# Patient Record
Sex: MALE | Race: WHITE | Employment: OTHER | URBAN - METROPOLITAN AREA
[De-identification: names, ages, dates, MRNs, and addresses within clinical notes are randomized per-mention and may not be internally consistent; named-entity substitution may affect disease eponyms.]

---

## 2022-11-02 ENCOUNTER — TELEPHONE (OUTPATIENT)
Dept: ORTHOPEDIC SURGERY | Age: 42
End: 2022-11-02

## 2022-11-02 ENCOUNTER — OFFICE VISIT (OUTPATIENT)
Dept: ORTHOPEDIC SURGERY | Age: 42
End: 2022-11-02
Payer: COMMERCIAL

## 2022-11-02 VITALS
OXYGEN SATURATION: 97 % | SYSTOLIC BLOOD PRESSURE: 137 MMHG | TEMPERATURE: 98.5 F | DIASTOLIC BLOOD PRESSURE: 62 MMHG | WEIGHT: 164 LBS | HEART RATE: 72 BPM

## 2022-11-02 DIAGNOSIS — S46.211A BICEPS TENDON RUPTURE, RIGHT, INITIAL ENCOUNTER: Primary | ICD-10-CM

## 2022-11-02 PROCEDURE — 99203 OFFICE O/P NEW LOW 30 MIN: CPT | Performed by: ORTHOPAEDIC SURGERY

## 2022-11-02 NOTE — PROGRESS NOTES
Identified pt with two pt identifiers (name and ). Reviewed chart in preparation for visit and have obtained necessary documentation. Nimisha Littlejohn is a 43 y.o. male  Chief Complaint   Patient presents with    Arm Pain     RT Bicep      Visit Vitals  /62 (BP 1 Location: Left upper arm, BP Patient Position: Sitting, BP Cuff Size: Large adult)   Pulse 72   Temp 98.5 °F (36.9 °C) (Tympanic)   Wt 164 lb (74.4 kg)   SpO2 97%     1. Have you been to the ER, urgent care clinic since your last visit? Hospitalized since your last visit? No    2. Have you seen or consulted any other health care providers outside of the [address] [place] [address] since your last visit? Include any pap smears or colon screening.  No

## 2022-11-02 NOTE — TELEPHONE ENCOUNTER
LVM for patient's wife to call back to make sure her previous message was understood correctly and why she is requesting a health provider to call in at [phone] option 3.

## 2022-11-03 ENCOUNTER — TELEPHONE (OUTPATIENT)
Dept: ORTHOPEDIC SURGERY | Age: 42
End: 2022-11-03

## 2022-11-03 NOTE — PROGRESS NOTES
11/3/2022      CC: Right elbow pain    HPI:      This is a 43y.o. year old male who has a history of injury to his right elbow when he was extending his arm in April 2022, 7 months ago, he had extreme pain, he treated this conservatively without work-up. He did well and was able to go back to his weight lifting, 2 weeks ago, he had a moment where his weights fell and he dropped them, this did cause pain in his right elbow, evaluation did indicate that he perhaps had ruptured his tendon. He is here for evaluation. He is right-hand dominant, but he has no functional deficit and no significant pain at this point. PMH:  History reviewed. No pertinent past medical history. PSxHx:  History reviewed. No pertinent surgical history. Meds:  No current outpatient medications on file. All:  Allergies   Allergen Reactions    Sulfa (Sulfonamide Antibiotics) Unknown (comments)     Pt was told he was allergic as a child, unsure of the reaction. Social Hx:  Social History     Socioeconomic History    Marital status:    Tobacco Use    Smoking status: Never    Smokeless tobacco: Never   Substance and Sexual Activity    Alcohol use: Never    Drug use: Never    Sexual activity: Not Currently       Family Hx:  History reviewed. No pertinent family history. Review of Systems:       General: Denies headache, lethargy, fever, weight loss  Ears/Nose/Throat: Denies ear discharge, drainage, nosebleeds, hoarse voice, dental problems  Cardiovascular: Denies chest pain, shortness of breath  Lungs: Denies chest pain, breathing problems, wheezing, pneumonia  Stomach: Denies stomach pain, heartburn, constipation, irritable bowel  Skin: Denies rash, sores, open wounds  Musculoskeletal: Right elbow pain  Genitourinary: Denies dysuria, hematuria, polyuria  Gastrointestinal: Denies constipation, obstipation, diarrhea  Neurological: Denies changes in sight, smell, hearing, taste, seizures.  Denies loss of consciousness. Psychiatric: Denies depression, sleep pattern changes, anxiety, change in personality  Endocrine: Denies mood swings, heat or cold intolerance  Hematologic/Lymphatic: Denies anemia, purpura, petechia  Allergic/Immunologic: Denies swelling of throat, pain or swelling at lymph nodes      Physical Examination:    Visit Vitals  /62 (BP 1 Location: Left upper arm, BP Patient Position: Sitting, BP Cuff Size: Large adult)   Pulse 72   Temp 98.5 °F (36.9 °C) (Tympanic)   Wt 164 lb (74.4 kg)   SpO2 97%        General: AOX3, no apparent distress  Psychiatric: mood and affect appropriate  Lungs: breathing is symmetric and unlabored bilaterally  Heart: regular rate and rhythm  Abdomen: no guarding  Head: normocephalic, atraumatic  Skin: No significant abnormalities, good turgor  Sensation intact to light touch: C5-T1 dermatomes  Muscular exam: 5/5 strength in all major muscle groups unless noted in specialty exam.    Extremities      Right upper extremity: Positive biceps tendon is noted, however there appears to be a slightly atrophic right biceps compared to left. Supination strength is identical to his left side. No overt rupture by clinical examination. Sensation intact light touch in the median, radial, ulnar nerve distributions. Left upper extremity: Extremity is examined, no evidence of gross deformity, no gross motor or sensory deficit. Full active and passive range of motion is noted. Muscle tone and bulk is within normal expected limits. Capillary refill is less than 2 seconds distally. Diagnostics:    Pertinent Diagnostics: None available    Assessment: Possible partial rupture, right biceps tendon  Plan:     This patient is now 7 months after his likely acute injury to his biceps tendon, this likely is either a partial injury or one that has scarred in and is quite functional.  He feels no functional deficit and pain, at this point, given the subacute presentation of this, I would like to order an MRI for possible surgical planning. He stated his understanding and satisfaction. We will reevaluate once his imaging is completed. MRI ordered today. Mr. Skyla De Oliveira has a reminder for a \"due or due soon\" health maintenance. I have asked that he contact his primary care provider for follow-up on this health maintenance.

## 2022-11-03 NOTE — TELEPHONE ENCOUNTER
Patient's wife called stating the patient's insurance is requiring a rep from the doctor's office to call to submit pre notification of the patient needing a MRI. The number to call is [phone] option 2.

## 2022-12-05 ENCOUNTER — TELEPHONE (OUTPATIENT)
Dept: ORTHOPEDIC SURGERY | Age: 42
End: 2022-12-05

## 2022-12-05 NOTE — TELEPHONE ENCOUNTER
Patient called and stated he would like to e-mail his MRI results however I explained with HIPAA it would be necessary for him to send it instead via [address] Patient agreed to send results via [employer] after being walked through the process.

## 2022-12-07 ENCOUNTER — VIRTUAL VISIT (OUTPATIENT)
Dept: ORTHOPEDIC SURGERY | Age: 42
End: 2022-12-07
Payer: COMMERCIAL

## 2022-12-07 DIAGNOSIS — S46.211A BICEPS TENDON RUPTURE, RIGHT, INITIAL ENCOUNTER: Primary | ICD-10-CM

## 2022-12-07 PROCEDURE — 99213 OFFICE O/P EST LOW 20 MIN: CPT | Performed by: ORTHOPAEDIC SURGERY

## 2022-12-07 NOTE — PROGRESS NOTES
12/7/2022      CC: Right biceps tendon rupture    HPI:      This is a 43y.o. year old male who presents for MRI review of his right elbow. He has no pain, no functional deficits, he is more mentally worried about it, but has no other concerns. PMH:  History reviewed. No pertinent past medical history. PSxHx:  History reviewed. No pertinent surgical history. Meds:  No current outpatient medications on file. All:  Allergies   Allergen Reactions    Sulfa (Sulfonamide Antibiotics) Unknown (comments)     Pt was told he was allergic as a child, unsure of the reaction. Social Hx:  Social History     Socioeconomic History    Marital status:    Tobacco Use    Smoking status: Never    Smokeless tobacco: Never   Substance and Sexual Activity    Alcohol use: Never    Drug use: Never    Sexual activity: Not Currently       Family Hx:  History reviewed. No pertinent family history. Review of Systems:       General: Denies headache, lethargy, fever, weight loss  Ears/Nose/Throat: Denies ear discharge, drainage, nosebleeds, hoarse voice, dental problems  Cardiovascular: Denies chest pain, shortness of breath  Lungs: Denies chest pain, breathing problems, wheezing, pneumonia  Stomach: Denies stomach pain, heartburn, constipation, irritable bowel  Skin: Denies rash, sores, open wounds  Musculoskeletal: No issues right elbow  Genitourinary: Denies dysuria, hematuria, polyuria  Gastrointestinal: Denies constipation, obstipation, diarrhea  Neurological: Denies changes in sight, smell, hearing, taste, seizures. Denies loss of consciousness. Psychiatric: Denies depression, sleep pattern changes, anxiety, change in personality  Endocrine: Denies mood swings, heat or cold intolerance  Hematologic/Lymphatic: Denies anemia, purpura, petechia  Allergic/Immunologic: Denies swelling of throat, pain or swelling at lymph nodes      Physical Examination:    There were no vitals taken for this visit.      General: AOX3, no apparent distress  Psychiatric: mood and affect appropriate        Diagnostics:    Pertinent Diagnostics: MRI is reviewed after being ordered by myself of the right elbow, there is a retracted but healed complete tear of the biceps tendon distally, it is retracted by 2.4 cm, however there is significant healing noted. There is a small tear of the brachialis tendon distally, however this appears not retracted as well. Assessment: Healed asymptomatic right biceps tendon rupture  Plan: This patient I had a long discussion regarding treatment options, in general, he has no functional deficits even though he is doing lifting, he has only some cognitive concerns which are reasonable after an injury, however he has no functional deficits. He has no cramping and he has no supination weakness. In general, he has healed this and he will likely continue to remodel this with conservative measures over the course of the next year. We discussed that he would likely do well treated conservatively and without surgery. He agreed with this and we went over the physiology of healing. Overall, he satisfied with his care and he will follow-up with me should there be any other concerns. He was in [place] at the time of consultation. I was in office at the time of encounter. Consent:  He and/or his healthcare decision maker is aware that this patient-initiated Telehealth encounter is a billable service, with coverage as determined by his insurance carrier. He is aware that he may receive a bill and has provided verbal consent to proceed: Yes    This virtual visit was conducted via [employer].   Pursuant to the emergency declaration under the [facility], [address] [place] [address] authority and the [employer] and [employer] General Act, this Virtual  Visit was conducted to reduce the patient's risk of exposure to COVID-19 and provide continuity of care for an established patient. Services were provided through a video synchronous discussion virtually to substitute for in-person clinic visit. Due to this being a TeleHealth evaluation, many elements of the physical examination are unable to be assessed. Total Time: minutes: 21-30 minutes. Mr. Jovon Ortiz has a reminder for a \"due or due soon\" health maintenance. I have asked that he contact his primary care provider for follow-up on this health maintenance.

## 2023-06-29 ENCOUNTER — CLINICAL DOCUMENTATION (OUTPATIENT)
Age: 43
End: 2023-06-29